# Patient Record
Sex: FEMALE
[De-identification: names, ages, dates, MRNs, and addresses within clinical notes are randomized per-mention and may not be internally consistent; named-entity substitution may affect disease eponyms.]

---

## 2022-05-21 ENCOUNTER — HOSPITAL ENCOUNTER (EMERGENCY)
Dept: HOSPITAL 97 - ER | Age: 47
Discharge: HOME | End: 2022-05-21
Payer: COMMERCIAL

## 2022-05-21 VITALS — SYSTOLIC BLOOD PRESSURE: 125 MMHG | DIASTOLIC BLOOD PRESSURE: 96 MMHG | TEMPERATURE: 98 F | OXYGEN SATURATION: 100 %

## 2022-05-21 DIAGNOSIS — S61.412A: Primary | ICD-10-CM
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
## 2022-05-21 PROCEDURE — 99284 EMERGENCY DEPT VISIT MOD MDM: CPT

## 2022-05-21 PROCEDURE — 0JQK0ZZ REPAIR LEFT HAND SUBCUTANEOUS TISSUE AND FASCIA, OPEN APPROACH: ICD-10-PCS

## 2022-05-21 NOTE — ER
Nurse's Notes                                                                                     

 [facility]                                                                 

Name: Tatum Salter                                                                               

Age: 46 yrs                                                                                       

Sex: Female                                                                                       

: 1975                                                                                   

MRN: [id number]                                                                                   

Arrival Date: 2022                                                                          

Time: 14:11                                                                                       

Account#: [id number]                                                                            

Bed DIS2                                                                                          

Private MD:                                                                                       

Diagnosis: Laceration without foreign body of left hand, initial encounter                        

                                                                                                  

Presentation:                                                                                     

                                                                                             

14:15 Chief complaint: Patient states: "I was cleaning my boots with a  and I cut   ss  

      my hand. I don't know if the blade is still in there or not." No active bleeding noted.     

      approximate 0.5- 1 inch laceration noted to L palm of hand. Coronavirus screen: Client      

      denies travel out of the [place] in the last 14 days. Ebola Screen: Patient denies             

      exposure to infectious person. Patient denies travel to an Ebola-affected area in the       

      21 days before illness onset. Initial Sepsis Screen: Does the patient meet any 2            

      criteria? No. Patient's initial sepsis screen is negative. Does the patient have a          

      suspected source of infection? No. Patient's initial sepsis screen is negative. Risk        

      Assessment: Do you want to hurt yourself or someone else? Patient reports no desire to      

      harm self or others. Onset of symptoms was May 21, 2022.                                    

14:15 Method Of Arrival: Ambulatory                                                           ss  

14:15 Acuity: ALLY 4                                                                           ss  

                                                                                                  

Historical:                                                                                       

- Allergies:                                                                                      

14:17 No Known Allergies;                                                                     ss  

- Home Meds:                                                                                      

14:17 None [Active];                                                                          ss  

- PMHx:                                                                                           

14:17 None;                                                                                   ss  

                                                                                                  

- Immunization history:: Last tetanus immunization: up to date.                                   

- Social history:: Smoking status: Patient denies any tobacco usage or history of.                

                                                                                                  

                                                                                                  

Screenin:21 Abuse screen: Denies threats or abuse. Denies injuries from another. Nutritional        ss  

      screening: No deficits noted. Tuberculosis screening: Never had TB. Fall Risk None          

      identified.                                                                                 

                                                                                                  

Assessment:                                                                                       

14:21 Reassessment: wet to dry dressing placed on L hand.                                       

                                                                                                  

Vital Signs:                                                                                      

14:15  / 96; Pulse 84; Resp 24; Temp 98.0(TE); Pulse Ox 100% on R/A; Weight 79.83 kg;   ss  

      Height 5 ft. 2 in. (157.48 cm); Pain 10/10;                                                 

14:15 Body Mass Index 32.19 (79.83 kg, 157.48 cm)                                               

                                                                                                  

ED Course:                                                                                        

14:11 Patient arrived in ED.                                                                  ds1 

14:15 Chilo Ewing PA is PHCP.                                                                cp  

14:15 Amita Meza MD is Attending Physician.                                           cp  

14:17 Triage completed.                                                                       ss  

14:17 Arm band placed on right wrist.                                                         ss  

14:21 Patient has correct armband on for positive identification.                             ss  

16:14 XRAY Hand LEFT 3 View In Process Unspecified.                                           EDMS

16:38 Jennifer Carl, HAZEL is Primary Nurse.                                                    ss  

16:42 Assist provider with laceration repair on left hand that was 2.5 cm. or less using        

      sutures. Set up tray. Performed by Chilo GUTIERREZ Dressed with 4X4s, Aminata, Neosporin,       

      Patient tolerated well. Patient did not have IV access during this emergency room visit.    

16:48 Velcro wrist splint applied to left wrist.                                              ss  

                                                                                                  

Administered Medications:                                                                         

14:26 Drug: Hydrocodone-Acetaminophen (7.5 mg-325 mg) 1 tabs Route: PO;                       ss  

16:18 Follow up: Response: No adverse reaction                                                ss  

14:26 Drug: Ibuprofen 800 mg Route: PO;                                                       ss  

16:18 Follow up: Response: No adverse reaction                                                ss  

16:00 Drug: Bupivacaine (0.5 %) 5 ml {Note: administered by NUBIA Woo} Volume: 10 ml; Route: ss  

      Infiltration;                                                                               

16:00 Drug: Lidocaine-Epinephrine -1%: (1:100,000) 5 ml {Note: Administered by leon Capone.} Volume: 20 ml; Route: Infiltration;                                                    

                                                                                                  

                                                                                                  

Outcome:                                                                                          

16:13 Discharge ordered by MD.                                                                cp  

16:42 Discharged to home ambulatory, with family.                                             ss  

16:42 Condition: good                                                                             

16:42 Instructed on discharge instructions, follow up and referral plans. Demonstrated            

      understanding of instructions, follow-up care, medications, Prescriptions given X 1.        

16:48 Patient left the ED.                                                                      

                                                                                                  

Signatures:                                                                                       

Dispatcher [employer]                           [facility]                                                 

VictoriaJones shaikhi                                ds1                                                  

Jennifer Carl RN                      RN   Chilo Butts PA PA   cp                                                   

                                                                                                  

**************************************************************************************************

## 2022-05-21 NOTE — EDPHYS
Physician Documentation                                                                           

 [facility]                                                                 

Name: Tatum Salter                                                                               

Age: 46 yrs                                                                                       

Sex: Female                                                                                       

: 1975                                                                                   

MRN: [id number]                                                                                   

Arrival Date: 2022                                                                          

Time: 14:11                                                                                       

Account#: [id number]                                                                            

Bed DIS2                                                                                          

Private MD:                                                                                       

ED Physician Amita Meza                                                                    

HPI:                                                                                              

                                                                                             

14:19 This 46 yrs old  Female presents to ER via Ambulatory with complaints of Hand   cp  

      Lac.                                                                                        

14:19 The patient or guardian reports a laceration.                                           cp  

14:19 The complaints affect the hyperthenar eminence of left hand.                            cp  

14:19 Context: resulted from using  to clean boots. Onset: The symptoms/episode     cp  

      began/occurred just prior to arrival. Associated signs and symptoms: Pertinent              

      negatives: cyanosis distally, decreased sensation distally.                                 

                                                                                                  

Historical:                                                                                       

- Allergies:                                                                                      

14:17 No Known Allergies;                                                                     ss  

- Home Meds:                                                                                      

14:17 None [Active];                                                                          ss  

- PMHx:                                                                                           

14:17 None;                                                                                   ss  

                                                                                                  

- Immunization history:: Last tetanus immunization: up to date.                                   

- Social history:: Smoking status: Patient denies any tobacco usage or history of.                

                                                                                                  

                                                                                                  

ROS:                                                                                              

14:25 Skin: Positive for laceration(s), of the hyperthenar eminence of left hand.             cp  

14:25 Constitutional: Negative for body aches, chills, fever.                                 cp  

14:25 Neck: Negative for pain with movement, pain at rest, stiffness.                             

14:25 Cardiovascular: Negative for chest pain, palpitations.                                      

14:25 Respiratory: Negative for cough, shortness of breath, wheezing.                             

14:25 Abdomen/GI: Negative for abdominal pain, nausea, vomiting, and diarrhea.                    

14:25 Back: Negative for pain at rest, pain with movement.                                        

14:25 Neuro: Negative for numbness.                                                               

14:25 All other systems are negative.                                                             

                                                                                                  

Exam:                                                                                             

14:30 Constitutional: The patient appears in no acute distress, alert, awake, non-toxic, well cp  

      developed, well nourished.                                                                  

14:30 Musculoskeletal/extremity: Extremities: grossly normal except: noted in the hyperthenar cp  

      eminence of left hand: laceration, tenderness, mild swelling, minimal bleeding, ROM:        

      limited active range of motion due to pain, in the left hand, Perfusion: the extremity      

      is normally perfused throughout, the  left hand Sensation intact.                           

                                                                                                  

Vital Signs:                                                                                      

14:15  / 96; Pulse 84; Resp 24; Temp 98.0(TE); Pulse Ox 100% on R/A; Weight 79.83 kg;   ss  

      Height 5 ft. 2 in. (157.48 cm); Pain 10/10;                                                 

14:15 Body Mass Index 32.19 (79.83 kg, 157.48 cm)                                             ss  

                                                                                                  

Laceration:                                                                                       

16:10 Wound Repair of 3cm ( 1.2in ) subcutaneous laceration to hyperthenar eminence of left   cp  

      hand. Linear shaped.. Distal neuro/vascular/tendon intact. Anesthesia: Wound                

      infiltrated with 3 mls of Lido/Marcaine. Wound prep: Moderate cleansing by me, Wound        

      irrigation by me. Skin closed with 3 4-0 Prolene using interrupted sutures and sterile      

      technique. Dressed with 4x4's. Patient tolerated well.                                      

                                                                                                  

MDM:                                                                                              

14:30 Differential diagnosis: open fracture, simple laceration, tendon injury, foreign body.  cp  

15:43 Patient medically screened.                                                             cp  

16:12 Data reviewed: vital signs, nurses notes, radiologic studies, plain films. Test         cp  

      interpretation: by ED physician or midlevel provider: xrays of right hand negative for      

      fracture and/or foreign body. Counseling: I had a detailed discussion with the patient      

      and/or guardian regarding: the historical points, exam findings, and any diagnostic         

      results supporting the discharge/admit diagnosis, radiology results, the need for           

      outpatient follow up, a family practitioner, to return to the emergency department if       

      symptoms worsen or persist or if there are any questions or concerns that arise at          

      home. Response to treatment: the patient's symptoms have markedly improved after            

      treatment, and as a result, I will discharge patient.                                       

                                                                                                  

                                                                                             

14:18 Order name: XRAY Hand LEFT 3 View                                                       cp  

                                                                                             

15:45 Order name: Dressing - Wound; Complete Time: 16:18                                      cp  

                                                                                             

15:45 Order name: Gloves, Sterile; Complete Time: 16:16                                       cp  

                                                                                             

15:45 Order name: Setup Suture Tray; Complete Time: 16:18                                     cp  

                                                                                             

16:11 Order name: Wrist Splint: right; Complete Time: 16:38                                   cp  

                                                                                             

16:11 Order name: Wound dressing; Complete Time: 16:38                                        cp  

                                                                                                  

Administered Medications:                                                                         

14:26 Drug: Hydrocodone-Acetaminophen (7.5 mg-325 mg) 1 tabs Route: PO;                         

16:18 Follow up: Response: No adverse reaction                                                  

14:26 Drug: Ibuprofen 800 mg Route: PO;                                                         

16:18 Follow up: Response: No adverse reaction                                                  

16:00 Drug: Bupivacaine (0.5 %) 5 ml {Note: administered by NUBIA Woo} Volume: 10 ml; Route: ss  

      Infiltration;                                                                               

16:00 Drug: Lidocaine-Epinephrine -1%: (1:100,000) 5 ml {Note: Administered by leon Capone} Volume: 20 ml; Route: Infiltration;                                                    

                                                                                                  

                                                                                                  

Disposition Summary:                                                                              

22 16:13                                                                                    

Discharge Ordered                                                                                 

      Location: Home                                                                          cp  

      Problem: new                                                                            cp  

      Symptoms: have improved                                                                 cp  

      Condition: Stable                                                                       cp  

      Diagnosis                                                                                   

        - Laceration without foreign body of left hand, initial encounter                     cp  

      Followup:                                                                               cp  

        - With: Private Physician                                                                  

        - When: 10 - 14 days                                                                       

        - Reason: Staple/Suture removal                                                            

      Discharge Instructions:                                                                     

        - Discharge Summary Sheet                                                             cp  

        - Laceration Care, Adult                                                              cp  

      Forms:                                                                                      

        - Medication Reconciliation Form                                                      cp  

        - Thank You Letter                                                                    cp  

        - Work release form                                                                   ss  

        - Antibiotic Education                                                                cp  

        - Prescription Opioid Use                                                             cp  

      Prescriptions:                                                                              

        - Naprosyn 500 mg Oral Tablet                                                              

            - take 1 tablet by ORAL route 2 times per day take with food; 20 tablet; Refills: cp  

      0, Product Selection Permitted                                                              

Signatures:                                                                                       

Dispatcher [employer]                           Jennifer Roa RN RN ss Page, Corey, PA PA cp                                                   

                                                                                                  

Corrections: (The following items were deleted from the chart)                                    

16:45 16:13 Laceration without foreign body of right hand, initial encounter cp               cp  

                                                                                             

13:55  14:19 The patient or guardian reports a laceration, clean, cp                     cp  

                                                                                                  

**************************************************************************************************

## 2022-06-05 ENCOUNTER — HOSPITAL ENCOUNTER (EMERGENCY)
Dept: HOSPITAL 97 - ER | Age: 47
Discharge: HOME | End: 2022-06-05
Payer: COMMERCIAL

## 2022-06-05 VITALS — OXYGEN SATURATION: 99 % | TEMPERATURE: 98 F | DIASTOLIC BLOOD PRESSURE: 78 MMHG | SYSTOLIC BLOOD PRESSURE: 139 MMHG

## 2022-06-05 DIAGNOSIS — Z48.02: Primary | ICD-10-CM

## 2022-06-05 PROCEDURE — 99281 EMR DPT VST MAYX REQ PHY/QHP: CPT

## 2022-06-05 NOTE — ER
Nurse's Notes                                                                                     

 [facility]                                                                 

Name: Tatum Salter                                                                               

Age: 47 yrs                                                                                       

Sex: Female                                                                                       

: 1975                                                                                   

MRN: [id number]                                                                                   

Arrival Date: 2022                                                                          

Time: 15:04                                                                                       

Account#: [id number]                                                                            

Bed Waiting                                                                                       

Private MD:                                                                                       

Diagnosis: Encounter for removal of sutures                                                       

                                                                                                  

Presentation:                                                                                     

                                                                                             

15:11 Chief complaint: Patient states: Sutures to R palm, accidentally stabbed herself while  ss  

      working on boots. Suture site healthy in appearance, approx 3 sutures noted.                

      Coronavirus screen: Vaccine status: Patient reports being unvaccinated. Ebola Screen:       

      No symptoms or risks identified at this time. Initial Sepsis Screen: Does the patient       

      meet any 2 criteria? No. Patient's initial sepsis screen is negative. Does the patient      

      have a suspected source of infection? No. Patient's initial sepsis screen is negative.      

      Risk Assessment: Do you want to hurt yourself or someone else? Patient reports no           

      desire to harm self or others.                                                              

15:11 Method Of Arrival: Ambulatory                                                           ss  

15:11 Acuity: ALLY 5                                                                           ss  

15:32 Onset of symptoms was 2022.                                                    ph  

                                                                                                  

Triage Assessment:                                                                                

15:31 General: Appears in no apparent distress. Behavior is calm, cooperative, appropriate    ph  

      for age, Denies fever. Pain: Complains of pain in left hand.                                

                                                                                                  

Historical:                                                                                       

- Allergies:                                                                                      

15:13 No Known Allergies;                                                                     ss  

                                                                                                  

- Immunization history:: Adult Immunizations up to date.                                          

- Social history:: Smoking status: Patient denies any tobacco usage or history of.                

- Family history:: not pertinent.                                                                 

                                                                                                  

                                                                                                  

Screening:                                                                                        

15:31 Abuse screen: Denies threats or abuse. Denies injuries from another. Nutritional        ph  

      screening: No deficits noted. Tuberculosis screening: No symptoms or risk factors           

      identified. Fall Risk None identified.                                                      

                                                                                                  

Assessment:                                                                                       

15:31 Reassessment: ERP in triage to remove sutures.                                          ph  

                                                                                                  

Vital Signs:                                                                                      

15:32  / 78; Pulse 81; Resp 18; Temp 98.0; Pulse Ox 99% on R/A;                         ph  

                                                                                                  

ED Course:                                                                                        

15:04 Patient arrived in ED.                                                                  am2 

15:09 Chilo Phillips MD is Attending Physician.                                             antelmo 

15:13 Triage completed.                                                                       ss  

15:18 Arm band placed on.                                                                     ss  

15:32 Patient has correct armband on for positive identification.                             ph  

15:32 No provider procedures requiring assistance completed. Patient did not have IV access   ph  

      during this emergency room visit.                                                           

                                                                                                  

Administered Medications:                                                                         

No medications were administered                                                                  

                                                                                                  

                                                                                                  

Medication:                                                                                       

15:32 VIS not applicable for this client.                                                     ph  

                                                                                                  

Outcome:                                                                                          

15:23 Discharge ordered by MD.                                                                antelmo 

15:32 Discharged to home ambulatory.                                                          ph  

15:32 Condition: good                                                                             

15:32 Discharge instructions given to patient, Instructed on discharge instructions, follow       

      up and referral plans. Demonstrated understanding of instructions, follow-up care.          

15:32 Patient left the ED.                                                                    ph  

                                                                                                  

Signatures:                                                                                       

Chilo Phillips MD MD cha Smirch, Shelby, RN                      RN                                                      

Melina Ford RN                      RN                                                      

Hailey Peralta                                                  

                                                                                                  

**************************************************************************************************

## 2022-06-05 NOTE — EDPHYS
Physician Documentation                                                                           

 [facility]                                                                 

Name: Tatum Salter                                                                               

Age: 47 yrs                                                                                       

Sex: Female                                                                                       

: 1975                                                                                   

MRN: [id number]                                                                                   

Arrival Date: 2022                                                                          

Time: 15:04                                                                                       

Account#: [id number]                                                                            

Bed Waiting                                                                                       

Private MD:                                                                                       

Chilo Alcala                                                                      

HPI:                                                                                              

                                                                                             

15:20 This 47 yrs old  Female presents to ER via Ambulatory with complaints of Suture antemlo 

      Removal.                                                                                    

15:20 The patient has sutures on the left hand. Previous treatment: The patient was initially antelmo 

      treated 15 day(s) ago. Sutures/staples progress: The patient has no c/o's. The wound is     

      well-healing with no redness, swelling, discharge, or dehiscence reported. The patient      

      has not experienced similar symptoms in the past.                                           

                                                                                                  

Historical:                                                                                       

- Allergies:                                                                                      

15:13 No Known Allergies;                                                                     ss  

                                                                                                  

- Immunization history:: Adult Immunizations up to date.                                          

- Social history:: Smoking status: Patient denies any tobacco usage or history of.                

- Family history:: not pertinent.                                                                 

                                                                                                  

                                                                                                  

ROS:                                                                                              

15:20 Constitutional: Negative for fever, chills, and weight loss, Eyes: Negative for injury, antelmo 

      pain, redness, and discharge, ENT: Negative for injury, pain, and discharge, Neck:          

      Negative for injury, pain, and swelling, Cardiovascular: Negative for chest pain,           

      palpitations, and edema, Respiratory: Negative for shortness of breath, cough,              

      wheezing, and pleuritic chest pain, Abdomen/GI: Negative for abdominal pain, nausea,        

      vomiting, diarrhea, and constipation, Back: Negative for injury and pain, : Negative      

      for injury, bleeding, discharge, and swelling, Skin: Negative for injury, rash, and         

      discoloration, Neuro: Negative for headache, weakness, numbness, tingling, and seizure,     

      Psych: Negative for depression, anxiety, suicide ideation, homicidal ideation, and          

      hallucinations, Allergy/Immunology: Negative for hives, rash, and allergies, Endocrine:     

      Negative for neck swelling, polydipsia, polyuria, polyphagia, and marked weight             

      changes, Hematologic/Lymphatic: Negative for swollen nodes, abnormal bleeding, and          

      unusual bruising.                                                                           

15:20 MS/extremity: Positive for pain, of the left hand.                                          

                                                                                                  

Exam:                                                                                             

15:20 Constitutional:  This is a well developed, well nourished patient who is awake, alert,  antelmo 

      and in no acute distress. Head/Face:  Normocephalic, atraumatic. Eyes:  Pupils equal        

      round and reactive to light, extra-ocular motions intact.  Lids and lashes normal.          

      Conjunctiva and sclera are non-icteric and not injected.  Cornea within normal limits.      

      Periorbital areas with no swelling, redness, or edema. ENT:  Nares patent. No nasal         

      discharge, no septal abnormalities noted.  Tympanic membranes are normal and external       

      auditory canals are clear.  Oropharynx with no redness, swelling, or masses, exudates,      

      or evidence of obstruction, uvula midline.  Mucous membranes moist. Neck:  Trachea          

      midline, no thyromegaly or masses palpated, and no cervical lymphadenopathy.  Supple,       

      full range of motion without nuchal rigidity, or vertebral point tenderness.  No            

      Meningismus. Chest/axilla:  Normal chest wall appearance and motion.  Nontender with no     

      deformity.  No lesions are appreciated. Cardiovascular:  Regular rate and rhythm with a     

      normal S1 and S2.  No gallops, murmurs, or rubs.  Normal PMI, no JVD.  No pulse             

      deficits. Respiratory:  Lungs have equal breath sounds bilaterally, clear to                

      auscultation and percussion.  No rales, rhonchi or wheezes noted.  No increased work of     

      breathing, no retractions or nasal flaring. Abdomen/GI:  Soft, non-tender, with normal      

      bowel sounds.  No distension or tympany.  No guarding or rebound.  No evidence of           

      tenderness throughout. Back:  No spinal tenderness.  No costovertebral tenderness.          

      Full range of motion. Skin:  Warm, dry with normal turgor.  Normal color with no            

      rashes, no lesions, and no evidence of cellulitis. Neuro:  Awake and alert, GCS 15,         

      oriented to person, place, time, and situation.  Cranial nerves II-XII grossly intact.      

      Motor strength 5/5 in all extremities.  Sensory grossly intact.  Cerebellar exam            

      normal.  Normal gait. Psych:  Awake, alert, with orientation to person, place and time.     

       Behavior, mood, and affect are within normal limits.                                       

15:20 Musculoskeletal/extremity: Circulation is intact in all extremities. Sensation intact.      

      Compartment Syndrome exam of affected extremity: is normal.                                 

                                                                                                  

Vital Signs:                                                                                      

15:32  / 78; Pulse 81; Resp 18; Temp 98.0; Pulse Ox 99% on R/A;                         ph  

                                                                                                  

Procedures:                                                                                       

15:20 Suture/Staple removal: Removed 3 sutures, from left hand, site appears well healed,     antelmo 

      dressed with band aid, Neosporin, Patient tolerated well.                                   

                                                                                                  

MDM:                                                                                              

15:09 Patient medically screened.                                                             [place] 

15:20 Data reviewed: vital signs, nurses notes.                                               [place] 

                                                                                                  

Administered Medications:                                                                         

No medications were administered                                                                  

                                                                                                  

                                                                                                  

Disposition Summary:                                                                              

22 15:23                                                                                    

Discharge Ordered                                                                                 

      Location: Home                                                                          [place] 

      Problem: new                                                                            antelmo 

      Symptoms: have improved                                                                 antelmo 

      Condition: Stable                                                                       antelmo 

      Diagnosis                                                                                   

        - Encounter for removal of sutures                                                    antelmo 

      Followup:                                                                               antelmo 

        - With: Private Physician                                                                  

        - When: 2 - 3 days                                                                         

        - Reason: Recheck today's complaints, Continuance of care, Re-evaluation by your           

      physician                                                                                   

      Discharge Instructions:                                                                     

        - Suture Removal, Care After                                                          antelmo 

        - Discharge Summary Sheet                                                             ss  

      Forms:                                                                                      

        - Medication Reconciliation Form                                                      antelmo 

        - Thank You Letter                                                                    antelmo 

        - Antibiotic Education                                                                [place] 

        - Work release form                                                                   ss  

        - Prescription Opioid Use                                                             [place] 

Signatures:                                                                                       

Chilo Phillips MD MD cha Smirch, Shelby, RN                      RN   ss                                                   

                                                                                                  

**************************************************************************************************

## 2022-06-16 ENCOUNTER — HOSPITAL ENCOUNTER (EMERGENCY)
Dept: HOSPITAL 97 - ER | Age: 47
Discharge: HOME | End: 2022-06-16
Payer: COMMERCIAL

## 2022-06-16 VITALS — DIASTOLIC BLOOD PRESSURE: 73 MMHG | SYSTOLIC BLOOD PRESSURE: 126 MMHG | OXYGEN SATURATION: 98 %

## 2022-06-16 DIAGNOSIS — I34.1: ICD-10-CM

## 2022-06-16 DIAGNOSIS — B34.9: Primary | ICD-10-CM

## 2022-06-16 DIAGNOSIS — R00.2: ICD-10-CM

## 2022-06-16 LAB
BUN BLD-MCNC: 18 MG/DL (ref 7–18)
GLUCOSE SERPLBLD-MCNC: 101 MG/DL (ref 74–106)
HCT VFR BLD CALC: 37.8 % (ref 36–45)
LYMPHOCYTES # SPEC AUTO: 1.6 K/UL (ref 0.7–4.9)
PMV BLD: 8.6 FL (ref 7.6–11.3)
POTASSIUM SERPL-SCNC: 3.7 MMOL/L (ref 3.5–5.1)
RBC # BLD: 4.48 M/UL (ref 3.86–4.86)
TROPONIN I SERPL HS-MCNC: 3.9 PG/ML (ref ?–58.9)

## 2022-06-16 PROCEDURE — 80048 BASIC METABOLIC PNL TOTAL CA: CPT

## 2022-06-16 PROCEDURE — 36415 COLL VENOUS BLD VENIPUNCTURE: CPT

## 2022-06-16 PROCEDURE — 93005 ELECTROCARDIOGRAM TRACING: CPT

## 2022-06-16 PROCEDURE — 84484 ASSAY OF TROPONIN QUANT: CPT

## 2022-06-16 PROCEDURE — 85025 COMPLETE CBC W/AUTO DIFF WBC: CPT

## 2022-06-16 PROCEDURE — 71045 X-RAY EXAM CHEST 1 VIEW: CPT

## 2022-06-16 PROCEDURE — 99284 EMERGENCY DEPT VISIT MOD MDM: CPT

## 2022-06-16 NOTE — RAD REPORT
EXAM DESCRIPTION:  RAD - Chest Single View - 6/16/2022 2:10 pm

 

CLINICAL HISTORY:  PALPITATIONS

 

COMPARISON:  No comparisons

 

FINDINGS:  Lines: None.

Lungs: No evidence of edema or pneumonia.

Pleural: No significant pleural effusions or pneumothorax.

Cardiac: The heart size is within normal limits.

Bones: No acute fractures.

Other:

 

IMPRESSION:  No acute cardiopulmonary disease.

## 2022-06-16 NOTE — ER
Nurse's Notes                                                                                     

 [facility]                                                                 

Name: Tatum Salter                                                                               

Age: 47 yrs                                                                                       

Sex: Female                                                                                       

: 1975                                                                                   

MRN: [id number]                                                                                   

Arrival Date: 2022                                                                          

Time: 13:17                                                                                       

Account#: [id number]                                                                            

Bed 5                                                                                             

Private MD:                                                                                       

Diagnosis: Palpitations;Viral illness;Pain in throat                                              

                                                                                                  

Presentation:                                                                                     

                                                                                             

13:22 Chief complaint: Patient states: feels light headed and everything is moving, and feels iw  

      like needles in her throat and pain in right ear , since yesterday, dizziness started       

      today. Coronavirus screen: At this time, the client does not indicate any symptoms          

      associated with coronavirus-19. Ebola Screen: Patient negative for fever greater than       

      or equal to 101.5 degrees Fahrenheit, and additional compatible Ebola Virus Disease         

      symptoms Patient denies exposure to infectious person. Patient denies travel to an          

      Ebola-affected area in the 21 days before illness onset. No symptoms or risks               

      identified at this time. Initial Sepsis Screen: Does the patient meet any 2 criteria?       

      No. Patient's initial sepsis screen is negative. Does the patient have a suspected          

      source of infection? No. Patient's initial sepsis screen is negative. Risk Assessment:      

      Do you want to hurt yourself or someone else? Patient reports no desire to harm self or     

      others. Onset of symptoms was 2022.                                                

13:22 Method Of Arrival: Ambulatory                                                             

13:22 Acuity: ALLY 3                                                                           iw  

                                                                                                  

OB/GYN:                                                                                           

13:25 LMP N/A - Hysterectomy                                                                  iw  

                                                                                                  

Historical:                                                                                       

- Allergies:                                                                                      

13:24 No Known Allergies;                                                                     iw  

- Home Meds:                                                                                      

13:24 None [Active];                                                                          iw  

- PMHx:                                                                                           

13:24 mitral valve prolapse;                                                                  iw  

- PSHx:                                                                                           

13:25  section; hysterectomy;                                                         iw  

                                                                                                  

- Immunization history:: Client reports having NOT received the Covid vaccine.                    

- Social history:: Smoking status: Patient denies any tobacco usage or history of.                

                                                                                                  

                                                                                                  

Screenin:43 Abuse screen: Denies threats or abuse. Nutritional screening: No deficits noted.        6 

      Tuberculosis screening: No symptoms or risk factors identified. Fall Risk None              

      identified.                                                                                 

                                                                                                  

Assessment:                                                                                       

13:39 General: Appears in no apparent distress. Behavior is calm, cooperative. Pain:          6 

      Complains of pain in right ear and neck Pain currently is 4 out of 10 on a pain scale.      

      Quality of pain is described as aching, sharp, Pain began 2-3 days ago. Neuro: Level of     

      Consciousness is awake, alert, obeys commands, Oriented to person, place, time,        

      are equal bilaterally Moves all extremities. Full function Speech is normal, Facial         

      symmetry appears normal, Pupils are PERRLA, Reports dizziness, since this am.               

      Cardiovascular: Reports palpitations, Capillary refill < 3 seconds Rhythm is sinus          

      rhythm. Respiratory: No deficits noted. Airway is patent Trachea midline Respiratory        

      effort is even, unlabored, Respiratory pattern is regular, symmetrical.                     

14:30 Reassessment: Patient appears in no apparent distress at this time. No changes from     jl7 

      previously documented assessment. Patient and/or family updated on plan of care and         

      expected duration. Pain level reassessed. Patient is alert, oriented x 3, equal             

      unlabored respirations, skin warm/dry/pink.                                                 

                                                                                                  

Vital Signs:                                                                                      

13:22  / 90; Pulse 85; Resp 16; Pulse Ox 99% on R/A;                                    iw  

14:30  / 73; Pulse 68; Resp 15; Pulse Ox 98% on R/A;                                    jl7 

                                                                                                  

ED Course:                                                                                        

13:17 Patient arrived in ED.                                                                  mr  

13:20 Brown Joseph DO is Attending Physician.                                                ms3 

13:24 Triage completed.                                                                       iw  

13:25 Arm band placed on.                                                                     iw  

13:25 Placed in gown. Bed in low position. Call light in reach. Side rails up X 1. Adult w/   jh6 

      patient.                                                                                    

13:25 Client placed on continuous cardiac and pulse oximetry monitoring. NIBP monitoring      6 

      applied. Cardiac monitor on. Pulse ox on. NIBP on.                                          

13:39 Thelma Bauer, RN is Primary Nurse.                                                 [facility] 

13:43 Initial lab(s) drawn, by me, sent to lab. EKG done. Inserted saline lock: 20 gauge in   6 

      left antecubital area, using aseptic technique. Blood collected.                            

14:12 XRAY Chest (1 view) In Process Unspecified.                                             EDMS

14:41 Dylan Kumar MD is Referral Physician.                                               ms3 

15:07 No provider procedures requiring assistance completed. IV discontinued, intact,         jl7 

      bleeding controlled, No redness/swelling at site. Pressure dressing applied.                

                                                                                                  

Administered Medications:                                                                         

No medications were administered                                                                  

                                                                                                  

                                                                                                  

Medication:                                                                                       

15:07 VIS not applicable for this client.                                                     jl7 

                                                                                                  

Outcome:                                                                                          

14:41 Discharge ordered by MD.                                                                ms3 

15:07 Discharged to home ambulatory, with family.                                             jl7 

15:07 Condition: stable                                                                           

15:07 Discharge instructions given to patient, family, Instructed on discharge instructions,      

      follow up and referral plans. Demonstrated understanding of instructions, follow-up         

      care.                                                                                       

15:11 Patient left the ED.                                                                    jl7 

                                                                                                  

Signatures:                                                                                       

Dispatcher [employer]                           EDMS                                                 

Veronica McfarlandMarylin, RN                     HAZEL   iw                                                   

Tabatha Leach RN                        RN   jl7                                                  

Brown Joseph DO                        DO   ms3                                                  

Thelma Bauer RN                   RN   jh6                                                  

                                                                                                  

Corrections: (The following items were deleted from the chart)                                    

13:25 13:24 PMHx: None; dalila conner  

                                                                                                  

**************************************************************************************************

## 2022-06-16 NOTE — EDPHYS
Physician Documentation                                                                           

 [facility]                                                                 

Name: Tatum Salter                                                                               

Age: 47 yrs                                                                                       

Sex: Female                                                                                       

: 1975                                                                                   

MRN: [id number]                                                                                   

Arrival Date: 2022                                                                          

Time: 13:17                                                                                       

Account#: [id number]                                                                            

Bed 5                                                                                             

Private MD:                                                                                       

ED Physician Brown Joseph                                                                         

HPI:                                                                                              

                                                                                             

14:48 This 47 yrs old  Female presents to ER via Ambulatory with complaints of        ms3 

      Palpitations, Dizziness.                                                                    

14:48 The patient presents with a history of "Strong beat". Context: The symptoms occur at    ms3 

      rest. Onset: The symptoms/episode began/occurred this morning. Duration: The patient or     

      guardian reports a single episode. Duration: The patient or guardian reports a single       

      episode. Modifying factors: The symptoms are aggravated by nothing. The symptoms are        

      alleviated by nothing. Associated signs and symptoms: The patient has no apparent           

      associated signs or symptoms, Pertinent positives: sore throat, ear pain, Pertinent         

      negatives: fever. Severity of symptoms: At their worst the symptoms were moderate in        

      the emergency department the symptoms are unchanged.                                        

                                                                                                  

OB/GYN:                                                                                           

13:25 LMP N/A - Hysterectomy                                                                  iw  

                                                                                                  

Historical:                                                                                       

- Allergies:                                                                                      

13:24 No Known Allergies;                                                                     iw  

- Home Meds:                                                                                      

13:24 None [Active];                                                                          iw  

- PMHx:                                                                                           

13:24 mitral valve prolapse;                                                                  iw  

- PSHx:                                                                                           

13:25  section; hysterectomy;                                                         iw  

                                                                                                  

- Immunization history:: Client reports having NOT received the Covid vaccine.                    

- Social history:: Smoking status: Patient denies any tobacco usage or history of.                

                                                                                                  

                                                                                                  

ROS:                                                                                              

14:48 Constitutional: Negative for fever, and chills. ENT: sore throat, ear pain Neck:        ms3 

      Negative for injury, pain, and swelling, Cardiovascular: Palpitations Respiratory:          

      Negative for shortness of breath, cough, wheezing, and pleuritic chest pain,                

      Abdomen/GI: Negative for abdominal pain, nausea, vomiting, diarrhea, and constipation.      

14:48 All other systems are negative.                                                             

                                                                                                  

Exam:                                                                                             

13:32 Constitutional:  This is a well developed, well nourished patient who is awake, alert,  ms3 

      and in no acute distress. Head/Face:  Normocephalic, atraumatic. Neck:  Trachea             

      midline, no cervical lymphadenopathy.  Supple, full range of motion without nuchal          

      rigidity, or vertebral point tenderness.  No Meningismus. Chest/axilla:  Normal chest       

      wall appearance and motion.  Nontender with no deformity.   Cardiovascular:  Regular        

      rate and rhythm with a normal S1 and S2.  No gallops, murmurs, or rubs.  Normal PMI, no     

      JVD.  No pulse deficits. Respiratory:  Lungs have equal breath sounds bilaterally,          

      clear to auscultation and percussion.  No rales, rhonchi or wheezes noted.  No              

      increased work of breathing, no retractions or nasal flaring. Abdomen/GI:  Soft,            

      non-tender, with normal bowel sounds.  No distension or tympany.  No guarding or            

      rebound.  No evidence of tenderness throughout. Skin:  Warm, dry with normal turgor.        

      Normal color with no rashes, no lesions, and no evidence of cellulitis. MS/ Extremity:      

      Pulses equal, no cyanosis.  Neurovascular intact.  Full, normal range of motion. Psych:     

       Awake, alert, with orientation to person, place and time.  Behavior, mood, and affect      

      are within normal limits.                                                                   

13:32 ECG was reviewed by the Attending Physician.                                                

14:48 ENT: External ear(s): are unremarkable, Ear canal(s): are normal, TM's: are normal,     ms3 

      Mouth: right posterior pharynx with ulcerations.                                            

                                                                                                  

Vital Signs:                                                                                      

13:22  / 90; Pulse 85; Resp 16; Pulse Ox 99% on R/A;                                    iw  

14:30  / 73; Pulse 68; Resp 15; Pulse Ox 98% on R/A;                                    jl7 

                                                                                                  

MDM:                                                                                              

13:52 Patient medically screened.                                                             ms3 

14:48 Differential diagnosis: arrythmia, Viral illness. Data reviewed: vital signs, nurses    ms3 

      notes, lab test result(s), EKG, radiologic studies. Data interpreted: Cardiac monitor:      

      rate is 85 beats/min, rhythm is normal sinus rhythm, regular, with no ectopy,               

      Interpretation: normal rate, normal rhythm. Counseling: I had a detailed discussion         

      with the patient and/or guardian regarding: the historical points, exam findings, and       

      any diagnostic results supporting the discharge/admit diagnosis, lab results, radiology     

      results, the need for outpatient follow up, to return to the emergency department if        

      symptoms worsen or persist or if there are any questions or concerns that arise at          

      home. ED course: Discussed labs, EKG, chest x-ray, physical exam findings with patient.     

      Patient to follow-up with her primary care physician in 2 to 3 days along with Dr. Kumar. Patient understands and agrees with plan. All questions were answered. Return       

      precautions discussed include worsening symptoms, or any other concerns. On                 

      reevaluation patient is alert and oriented x4, in no apparent distress, nontoxic,           

      ambulatory in the emergency department, speaking full sentences..                           

                                                                                                  

                                                                                             

13:56 Order name: Basic Metabolic Panel; Complete Time: 14:38                                 ms3 

                                                                                             

13:56 Order name: CBC with Diff; Complete Time: 14:38                                         ms3 

                                                                                             

13:56 Order name: Troponin HS; Complete Time: 14:38                                           ms3 

                                                                                             

13:56 Order name: XRAY Chest (1 view); Complete Time: 14:38                                   ms3 

                                                                                             

13:56 Order name: EKG; Complete Time: 13:56                                                   ms3 

                                                                                             

13:56 Order name: Cardiac monitoring; Complete Time: 14:44                                    ms3 

                                                                                             

13:56 Order name: EKG - Nurse/Tech; Complete Time: 14:44                                      ms3 

                                                                                             

13:56 Order name: IV Saline Lock; Complete Time: 14:44                                        ms3 

                                                                                             

13:56 Order name: Labs collected and sent; Complete Time: 14:44                               ms3 

                                                                                             

13:56 Order name: O2 Per Protocol; Complete Time: 14:44                                       ms3 

                                                                                             

13:56 Order name: O2 Sat Monitoring; Complete Time: 14:44                                     ms3 

                                                                                                  

EC:32 Rate is 76 beats/min. Rhythm is regular. QRS Axis is Normal. QRS interval is normal.    ms3 

      Clinical impression: NSR w/ Non-specific ST/T Changes. Interpreted by me.                   

                                                                                                  

Administered Medications:                                                                         

No medications were administered                                                                  

                                                                                                  

                                                                                                  

Disposition Summary:                                                                              

22 14:41                                                                                    

Discharge Ordered                                                                                 

      Location: Home                                                                          ms3 

      Condition: Stable                                                                       ms3 

      Diagnosis                                                                                   

        - Palpitations                                                                        ms3 

        - Viral illness                                                                       ms3 

        - Pain in throat                                                                      ms3 

      Followup:                                                                               ms3 

        - With: Dylan Kumar MD                                                                 

        - When: 2 - 3 days                                                                         

        - Reason: Re-evaluation by your physician                                                  

      Discharge Instructions:                                                                     

        - Discharge Summary Sheet                                                             ms3 

        - Palpitations                                                                        ms3 

        - Sore Throat                                                                         ms3 

        - Palpitations, Easy-to-Read                                                          ms3 

      Forms:                                                                                      

        - Medication Reconciliation Form                                                      ms3 

        - Thank You Letter                                                                    ms3 

        - Antibiotic Education                                                                ms3 

        - Prescription Opioid Use                                                             ms3 

Signatures:                                                                                       

Dispatcher [employer]                           Marylin Gilliland RN                     RN   Carlos Buchananus,                         DO   ms3                                                  

                                                                                                  

Corrections: (The following items were deleted from the chart)                                    

13:25 13:24 PMHx: None; [facility]  

14:51 13:32 Constitutional: This is a well developed, well nourished patient who is awake,    ms3 

      alert, and in no acute distress. Head/Face: Normocephalic, atraumatic. Neck: Trachea        

      midline, no cervical lymphadenopathy. Supple, full range of motion without nuchal           

      rigidity, or vertebral point tenderness. No Meningismus. Chest/axilla: Normal chest         

      wall appearance and motion. Nontender with no deformity. Cardiovascular: Regular rate       

      and rhythm with a normal S1 and S2. No gallops, murmurs, or rubs. Normal PMI, no JVD.       

      No pulse deficits. Respiratory: Lungs have equal breath sounds bilaterally, clear to        

      auscultation and percussion. No rales, rhonchi or wheezes noted. No increased work of       

      breathing, no retractions or nasal flaring. Abdomen/GI: Soft, non-tender, with normal       

      bowel sounds. No distension or tympany. No guarding or rebound. No evidence of              

      tenderness throughout. Skin: Warm, dry with normal turgor. Normal color with no rashes,     

      no lesions, and no evidence of cellulitis. MS/ Extremity: Pulses equal, no cyanosis.        

      Neurovascular intact. Full, normal range of motion. Psych: Awake, alert, with               

      orientation to person, place and time. Behavior, mood, and affect are within normal         

      limits. ms3                                                                                 

                                                                                                  

**************************************************************************************************

## 2022-06-18 NOTE — EKG
Test Date:    2022-06-16               Test Time:    13:32:24

Technician:   HUMPHREY                                     

                                                     

MEASUREMENT RESULTS:                                       

Intervals:                                           

Rate:         76                                     

MS:           136                                    

QRSD:         70                                     

QT:           350                                    

QTc:          393                                    

Axis:                                                

P:            7                                      

MS:           136                                    

QRS:          74                                     

T:            45                                     

                                                     

INTERPRETIVE STATEMENTS:                                       

                                                     

Normal sinus rhythm

Cannot rule out Anterior infarct, age undetermined

Abnormal ECG

No previous ECG available for comparison



Electronically Signed On 06-18-22 08:55:37 CDT by Akshat Sierra